# Patient Record
Sex: MALE | Race: BLACK OR AFRICAN AMERICAN | URBAN - METROPOLITAN AREA
[De-identification: names, ages, dates, MRNs, and addresses within clinical notes are randomized per-mention and may not be internally consistent; named-entity substitution may affect disease eponyms.]

---

## 2022-07-26 ENCOUNTER — APPOINTMENT (RX ONLY)
Dept: URBAN - METROPOLITAN AREA CLINIC 66 | Facility: CLINIC | Age: 67
Setting detail: DERMATOLOGY
End: 2022-07-26

## 2022-07-26 DIAGNOSIS — L82.1 OTHER SEBORRHEIC KERATOSIS: ICD-10-CM

## 2022-07-26 DIAGNOSIS — D18.0 HEMANGIOMA: ICD-10-CM

## 2022-07-26 DIAGNOSIS — Z71.89 OTHER SPECIFIED COUNSELING: ICD-10-CM

## 2022-07-26 DIAGNOSIS — L72.8 OTHER FOLLICULAR CYSTS OF THE SKIN AND SUBCUTANEOUS TISSUE: ICD-10-CM

## 2022-07-26 DIAGNOSIS — L85.3 XEROSIS CUTIS: ICD-10-CM

## 2022-07-26 DIAGNOSIS — L81.4 OTHER MELANIN HYPERPIGMENTATION: ICD-10-CM

## 2022-07-26 PROBLEM — D18.01 HEMANGIOMA OF SKIN AND SUBCUTANEOUS TISSUE: Status: ACTIVE | Noted: 2022-07-26

## 2022-07-26 PROCEDURE — ? DEFER

## 2022-07-26 PROCEDURE — ? COUNSELING

## 2022-07-26 PROCEDURE — 99203 OFFICE O/P NEW LOW 30 MIN: CPT

## 2022-07-26 PROCEDURE — ? FULL BODY SKIN EXAM

## 2022-07-26 PROCEDURE — ? TREATMENT REGIMEN

## 2022-07-26 ASSESSMENT — LOCATION ZONE DERM
LOCATION ZONE: PENIS
LOCATION ZONE: TRUNK

## 2022-07-26 ASSESSMENT — LOCATION SIMPLE DESCRIPTION DERM
LOCATION SIMPLE: PENIS
LOCATION SIMPLE: LEFT UPPER BACK
LOCATION SIMPLE: LEFT LOWER BACK

## 2022-07-26 ASSESSMENT — LOCATION DETAILED DESCRIPTION DERM
LOCATION DETAILED: LEFT MEDIAL UPPER BACK
LOCATION DETAILED: LEFT INFERIOR MEDIAL UPPER BACK
LOCATION DETAILED: LEFT SUPERIOR MEDIAL MIDBACK
LOCATION DETAILED: RIGHT DORSAL SHAFT OF PENIS
LOCATION DETAILED: LEFT INFERIOR MEDIAL MIDBACK

## 2022-07-26 NOTE — PROCEDURE: DEFER
Detail Level: Detailed
Introduction Text (Please End With A Colon): The following procedure was deferred:shave biopsy
Reason To Defer Override: remove if patient wants to remove it

## 2022-07-26 NOTE — HPI: EVALUATION OF SKIN LESION(S)
What Type Of Note Output Would You Prefer (Optional)?: Standard Output
Hpi Title: Evaluation of Skin Lesions
How Severe Are Your Spot(S)?: mild
Have Your Spot(S) Been Treated In The Past?: has not been treated
Additional History: Full body spot on penis.